# Patient Record
Sex: MALE | Race: WHITE | NOT HISPANIC OR LATINO | Employment: UNEMPLOYED | URBAN - METROPOLITAN AREA
[De-identification: names, ages, dates, MRNs, and addresses within clinical notes are randomized per-mention and may not be internally consistent; named-entity substitution may affect disease eponyms.]

---

## 2024-07-24 ENCOUNTER — APPOINTMENT (EMERGENCY)
Dept: CT IMAGING | Facility: HOSPITAL | Age: 24
End: 2024-07-24
Payer: COMMERCIAL

## 2024-07-24 ENCOUNTER — HOSPITAL ENCOUNTER (EMERGENCY)
Facility: HOSPITAL | Age: 24
Discharge: HOME | End: 2024-07-24
Attending: EMERGENCY MEDICINE
Payer: COMMERCIAL

## 2024-07-24 VITALS
DIASTOLIC BLOOD PRESSURE: 69 MMHG | RESPIRATION RATE: 18 BRPM | SYSTOLIC BLOOD PRESSURE: 145 MMHG | BODY MASS INDEX: 23.96 KG/M2 | HEIGHT: 62 IN | HEART RATE: 94 BPM | TEMPERATURE: 98.3 F | OXYGEN SATURATION: 98 % | WEIGHT: 130.2 LBS

## 2024-07-24 DIAGNOSIS — R07.81 RIB PAIN: Primary | ICD-10-CM

## 2024-07-24 PROCEDURE — 99284 EMERGENCY DEPT VISIT MOD MDM: CPT

## 2024-07-24 PROCEDURE — 71250 CT THORAX DX C-: CPT

## 2024-07-24 PROCEDURE — 96372 THER/PROPH/DIAG INJ SC/IM: CPT

## 2024-07-24 RX ORDER — METHOCARBAMOL 500 MG/1
500 TABLET, FILM COATED ORAL 2 TIMES DAILY
Qty: 20 TABLET | Refills: 0 | Status: SHIPPED | OUTPATIENT
Start: 2024-07-24

## 2024-07-24 RX ORDER — KETOROLAC TROMETHAMINE 30 MG/ML
15 INJECTION, SOLUTION INTRAMUSCULAR; INTRAVENOUS ONCE
Status: COMPLETED | OUTPATIENT
Start: 2024-07-24 | End: 2024-07-24

## 2024-07-24 RX ORDER — NAPROXEN 500 MG/1
500 TABLET ORAL 2 TIMES DAILY WITH MEALS
Qty: 30 TABLET | Refills: 0 | Status: SHIPPED | OUTPATIENT
Start: 2024-07-24

## 2024-07-24 RX ADMIN — KETOROLAC TROMETHAMINE 15 MG: 30 INJECTION, SOLUTION INTRAMUSCULAR; INTRAVENOUS at 03:31

## 2024-07-24 NOTE — ED PROVIDER NOTES
History  Chief Complaint   Patient presents with   • Rib Pain     Pt states he fell on L side a week ago, the pain was getting better but tonight got a lot worse and did not get it checked when he fell. Took ibuprofen 200mg around 45 minutes ago      The patient is a 23 y.o. male who presents to [facility] with a chief complaint of left sided rib pain. Symptoms began about a week ago when he fell while rollerblading and had improved but now seem to be worse. His pain is currently rated as a 6/10 in severity and described as sharp without radiation. Associated symptoms include pain with movement. Symptoms are aggravated with palpation and deep breaths and alleviating factors include none noted. The patient denies fever, chills, night sweats, shortness of breath, wheezing, cough, sputum, hemoptysis, hematemesis, head strike, neck pain, loss of consciousness, dizziness, lightheadedness, numbness, tingling. No other reported symptoms at this time.  Patient denies allergies to anything            History provided by:  Patient   used: No        None       History reviewed. No pertinent past medical history.    History reviewed. No pertinent surgical history.    History reviewed. No pertinent family history.  I have reviewed and agree with the history as documented.    E-Cigarette/Vaping     E-Cigarette/Vaping Substances     Social History     Tobacco Use   • Smoking status: Never   • Smokeless tobacco: Never   Substance Use Topics   • Alcohol use: Never   • Drug use: Never       Review of Systems   Constitutional:  Negative for chills and fever.   HENT:  Negative for ear pain and sore throat.    Eyes:  Negative for pain and visual disturbance.   Respiratory:  Negative for cough and shortness of breath.    Cardiovascular:  Negative for chest pain and palpitations.   Gastrointestinal:  Negative for abdominal pain and vomiting.   Genitourinary:  Negative for dysuria and hematuria.    Musculoskeletal:  Positive for arthralgias. Negative for back pain.   Skin:  Negative for color change and rash.   Neurological:  Negative for seizures and syncope.   All other systems reviewed and are negative.      Physical Exam  Physical Exam  Vitals reviewed.   Constitutional:       General: He is not in acute distress.     Appearance: Normal appearance. He is not ill-appearing, toxic-appearing or diaphoretic.   HENT:      Head: Normocephalic.   Eyes:      Conjunctiva/sclera: Conjunctivae normal.   Neck:      Vascular: No carotid bruit.   Cardiovascular:      Rate and Rhythm: Normal rate.      Pulses: Normal pulses.   Pulmonary:      Effort: Pulmonary effort is normal. No respiratory distress.      Breath sounds: Normal breath sounds. No stridor. No wheezing, rhonchi or rales.   Chest:      Chest wall: Tenderness present. No mass or lacerations.          Comments: Pain directly over ribs on the left side  Abdominal:      General: Abdomen is flat. Bowel sounds are normal.      Palpations: Abdomen is soft.      Tenderness: There is no abdominal tenderness.   Musculoskeletal:         General: Normal range of motion.      Cervical back: Normal range of motion. No rigidity or tenderness.   Lymphadenopathy:      Cervical: No cervical adenopathy.   Skin:     General: Skin is warm and dry.      Coloration: Skin is not jaundiced.      Findings: No bruising or lesion.   Neurological:      Mental Status: He is alert.       Vital Signs  ED Triage Vitals   Temperature Pulse Respirations Blood Pressure SpO2   07/24/24 0134 07/24/24 0134 07/24/24 0134 07/24/24 0134 07/24/24 0134   98.3 °F (36.8 °C) 94 18 145/69 98 %      Temp Source Heart Rate Source Patient Position - Orthostatic VS BP Location FiO2 (%)   07/24/24 0134 07/24/24 0134 07/24/24 0134 07/24/24 0134 --   Oral Monitor Sitting Left arm       Pain Score       07/24/24 0331       5           Vitals:    07/24/24 0134   BP: 145/69   Pulse: 94   Patient Position -  Orthostatic VS: Sitting         Visual Acuity      ED Medications  Medications   ketorolac (TORADOL) injection 15 mg (15 mg Intramuscular Given 7/24/24 0331)       Diagnostic Studies  Results Reviewed       None                   CT chest without contrast   Final Result by Jacqueline Rosas MD (07/24 0239)      No evidence of acute traumatic injury throughout the chest.               Workstation performed: [id number]                    Procedures  Procedures         ED Course  ED Course as of 07/24/24 0538   Wed Jul 24, 2024   0244 CT chest without contrast    No evidence of acute traumatic injury throughout the chest.                                    SBIRT 20yo+      Flowsheet Row Most Recent Value   Initial Alcohol Screen: US AUDIT-C     1. How often do you have a drink containing alcohol? 0 Filed at: 07/24/2024 0135   2. How many drinks containing alcohol do you have on a typical day you are drinking?  0 Filed at: 07/24/2024 0135   3a. Male UNDER 65: How often do you have five or more drinks on one occasion? 0 Filed at: 07/24/2024 0135   Audit-C Score 0 Filed at: 07/24/2024 0135   FRANCIS: How many times in the past year have you...    Used an illegal drug or used a prescription medication for non-medical reasons? Never Filed at: 07/24/2024 0135                      Medical Decision Making  The patient is a 23 y.o. male who presents to [facility] with a chief complaint of left sided rib pain. Symptoms began about a week ago when he fell while rollerblading and had improved but now seem to be worse.  Patient is well-appearing with normal vitals signs and is without shortness of breath. No ecchymosis or deformities visualized or palpated. Pain is reproducible with palpation. Patient is concerned for rib injuries. Shared decision making was had with the patient and he would prefer CT of the chest. CT chest showed No evidence of acute traumatic injury throughout the chest. Discussed with patient and will  provide naproxen, robaxen and incentive spirometer. Patient given strict return parameters. Prior to discharge, discharge instructions were discussed with patient at bedside. Patient was provided both verbal and written instructions. Patient is understanding of the discharge instructions and is agreeable to plan of care. Return precautions were discussed with patient bedside, patient verbalized understanding of signs and symptoms that would necessitate return to the ED. All questions were answered. Patient was comfortable with the plan of care and discharged to home.       Problems Addressed:  Rib pain: acute illness or injury    Amount and/or Complexity of Data Reviewed  Radiology: ordered. Decision-making details documented in ED Course.    Risk  Prescription drug management.               Disposition  Final diagnoses:   Rib pain     Time reflects when diagnosis was documented in both MDM as applicable and the Disposition within this note       Time User Action Codes Description Comment    7/24/2024  2:45 AM Rodrick Cowan Add [R07.81] Rib pain           ED Disposition       ED Disposition   Discharge    Condition   Stable    Date/Time   Wed Jul 24, 2024 0245    Comment   Alonso Collins discharge to home/self care.                   Follow-up Information       Follow up With Specialties Details Why Contact Info Additional Information    [facility] Family Medicine Schedule an appointment as soon as possible for a visit   [address] [address] [phone] [facility]], [address], [place], [place], [address], [phone]    [facility] Emergency Medicine  If symptoms worsen [address] [id number] [phone] [facility], [address] [place], [place], [address]            Discharge Medication  List as of 7/24/2024  2:47 AM        START taking these medications    Details   methocarbamol (ROBAXIN) 500 mg tablet Take 1 tablet (500 mg total) by mouth 2 (two) times a day, Starting Wed 7/24/2024, Print      naproxen (Naprosyn) 500 mg tablet Take 1 tablet (500 mg total) by mouth 2 (two) times a day with meals, Starting Wed 7/24/2024, Print             No discharge procedures on file.    PDMP Review       None            ED Provider  Electronically Signed by             Rodrick Cowan PA-C  07/24/24 4517

## 2024-07-24 NOTE — DISCHARGE INSTRUCTIONS
Follow up with PCP. Take medicine as directed. If any symptoms worsen or new symptoms appear return to the ER.